# Patient Record
Sex: MALE | Race: BLACK OR AFRICAN AMERICAN
[De-identification: names, ages, dates, MRNs, and addresses within clinical notes are randomized per-mention and may not be internally consistent; named-entity substitution may affect disease eponyms.]

---

## 2018-03-28 PROBLEM — Z00.00 ENCOUNTER FOR PREVENTIVE HEALTH EXAMINATION: Status: ACTIVE | Noted: 2018-03-28

## 2018-04-03 ENCOUNTER — APPOINTMENT (OUTPATIENT)
Dept: OPHTHALMOLOGY | Facility: CLINIC | Age: 68
End: 2018-04-03
Payer: COMMERCIAL

## 2018-04-03 PROCEDURE — 92004 COMPRE OPH EXAM NEW PT 1/>: CPT

## 2018-05-17 ENCOUNTER — APPOINTMENT (OUTPATIENT)
Dept: OPHTHALMOLOGY | Facility: CLINIC | Age: 68
End: 2018-05-17
Payer: COMMERCIAL

## 2018-05-17 PROCEDURE — 92012 INTRM OPH EXAM EST PATIENT: CPT

## 2018-05-17 RX ORDER — PREDNISOLONE ACETATE 10 MG/ML
1 SUSPENSION/ DROPS OPHTHALMIC 3 TIMES DAILY
Qty: 1 | Refills: 5 | Status: ACTIVE | COMMUNITY
Start: 2018-05-17 | End: 1900-01-01

## 2018-06-28 ENCOUNTER — APPOINTMENT (OUTPATIENT)
Dept: OPHTHALMOLOGY | Facility: CLINIC | Age: 68
End: 2018-06-28
Payer: COMMERCIAL

## 2018-06-28 PROCEDURE — 92012 INTRM OPH EXAM EST PATIENT: CPT

## 2018-08-02 ENCOUNTER — APPOINTMENT (OUTPATIENT)
Dept: OPHTHALMOLOGY | Facility: CLINIC | Age: 68
End: 2018-08-02
Payer: COMMERCIAL

## 2018-08-02 PROCEDURE — 92012 INTRM OPH EXAM EST PATIENT: CPT

## 2019-03-22 ENCOUNTER — APPOINTMENT (OUTPATIENT)
Dept: OPHTHALMOLOGY | Facility: CLINIC | Age: 69
End: 2019-03-22
Payer: COMMERCIAL

## 2019-03-22 DIAGNOSIS — H16.301: ICD-10-CM

## 2019-03-22 PROCEDURE — 92014 COMPRE OPH EXAM EST PT 1/>: CPT

## 2019-10-28 ENCOUNTER — APPOINTMENT (OUTPATIENT)
Dept: OPHTHALMOLOGY | Facility: CLINIC | Age: 69
End: 2019-10-28
Payer: COMMERCIAL

## 2019-10-28 ENCOUNTER — NON-APPOINTMENT (OUTPATIENT)
Age: 69
End: 2019-10-28

## 2019-10-28 PROCEDURE — 92012 INTRM OPH EXAM EST PATIENT: CPT

## 2020-02-20 ENCOUNTER — HOSPITAL ENCOUNTER (INPATIENT)
Dept: HOSPITAL 74 - JER | Age: 70
LOS: 2 days | Discharge: HOME | DRG: 660 | End: 2020-02-22
Attending: FAMILY MEDICINE | Admitting: FAMILY MEDICINE
Payer: COMMERCIAL

## 2020-02-20 VITALS — BODY MASS INDEX: 28.9 KG/M2

## 2020-02-20 DIAGNOSIS — N40.0: ICD-10-CM

## 2020-02-20 DIAGNOSIS — I69.351: ICD-10-CM

## 2020-02-20 DIAGNOSIS — I10: ICD-10-CM

## 2020-02-20 DIAGNOSIS — E78.5: ICD-10-CM

## 2020-02-20 DIAGNOSIS — N13.2: Primary | ICD-10-CM

## 2020-02-20 LAB
ALBUMIN SERPL-MCNC: 3.9 G/DL (ref 3.4–5)
ALP SERPL-CCNC: 55 U/L (ref 45–117)
ALT SERPL-CCNC: 30 U/L (ref 13–61)
ANION GAP SERPL CALC-SCNC: 6 MMOL/L (ref 8–16)
AST SERPL-CCNC: 28 U/L (ref 15–37)
BASOPHILS # BLD: 0.3 % (ref 0–2)
BILIRUB SERPL-MCNC: 0.5 MG/DL (ref 0.2–1)
BUN SERPL-MCNC: 15.8 MG/DL (ref 7–18)
CALCIUM SERPL-MCNC: 9.1 MG/DL (ref 8.5–10.1)
CHLORIDE SERPL-SCNC: 104 MMOL/L (ref 98–107)
CO2 SERPL-SCNC: 28 MMOL/L (ref 21–32)
CREAT SERPL-MCNC: 1.3 MG/DL (ref 0.55–1.3)
DEPRECATED RDW RBC AUTO: 13.8 % (ref 11.9–15.9)
EOSINOPHIL # BLD: 0.1 % (ref 0–4.5)
GLUCOSE SERPL-MCNC: 124 MG/DL (ref 74–106)
HCT VFR BLD CALC: 46.9 % (ref 35.4–49)
HGB BLD-MCNC: 16 GM/DL (ref 11.7–16.9)
LYMPHOCYTES # BLD: 8 % (ref 8–40)
MCH RBC QN AUTO: 30.6 PG (ref 25.7–33.7)
MCHC RBC AUTO-ENTMCNC: 34.2 G/DL (ref 32–35.9)
MCV RBC: 89.4 FL (ref 80–96)
MONOCYTES # BLD AUTO: 4.4 % (ref 3.8–10.2)
NEUTROPHILS # BLD: 87.2 % (ref 42.8–82.8)
PLATELET # BLD AUTO: 160 K/MM3 (ref 134–434)
PMV BLD: 9.8 FL (ref 7.5–11.1)
POTASSIUM SERPLBLD-SCNC: 3.9 MMOL/L (ref 3.5–5.1)
PROT SERPL-MCNC: 7.6 G/DL (ref 6.4–8.2)
RBC # BLD AUTO: 5.24 M/MM3 (ref 4–5.6)
SODIUM SERPL-SCNC: 139 MMOL/L (ref 136–145)
WBC # BLD AUTO: 12.2 K/MM3 (ref 4–10)

## 2020-02-20 NOTE — PDOC
History of Present Illness





- General


Chief Complaint: Pain, Acute


Stated Complaint: BACK PAIN


History Source: Patient


Exam Limitations: No Limitations





- History of Present Illness


Initial Comments: 





02/20/20 23:56


69 yo M with a hx of HTN, HLD, CVA (right sided deficits (Weakness)) and 

nephrolithiasis presents to the emergency department with right flank pain. Per 

the patient, he states it occurred suddenly this morning. The pain is 

consistent with his previous episode of nephrolithiasis and is sharp in nature, 

8/10, and worsens with movement and denies relieving factors. Endorses nausea 

and vomiting. Denies the following: fevers, chills, chest pain, SOB, abdominal 

pain, dysuria, hematuria, diarrhea, urinary urgency, urinary frequency, diarrhea

, hematochezia, testicular swelling/pain, penile discharge, and constipation. 





Allergies: NKDA











Past History





- Past Medical History


Allergies/Adverse Reactions: 


 Allergies











Allergy/AdvReac Type Severity Reaction Status Date / Time


 


No Known Drug Allergies Allergy   Verified 02/20/20 20:33











Home Medications: 


Ambulatory Orders





Ascorbic Acid [Vitamin C -] 1,000 mg PO DAILY 05/05/14 


Aspirin Coated [Ecotrin -] 81 mg PO DAILY 05/05/14 


Hydrochlorothiazide [Hctz -] 25 mg PO DAILY 05/05/14 


Metoprolol Succinate [Toprol XL -] 25 mg PO DAILY 05/05/14 


Valsartan [Diovan] 160 mg PO DAILY 05/05/14 


Vitamin E [Formula E] 400 unit PO DAILY 05/05/14 


Oxycodone HCl/Acetaminophen [Percocet 5-325 mg Tablet -] 1 - 2 combo PO Q6H PRN 

#40 tab 05/14/14 








CVA: Yes ('06 RIGHT SIDED WEAKNESS)


COPD: No


HTN: Yes


Hypercholesterolemia: Yes





- Surgical History


Abdominal Surgery: Yes (HERNIA)





- Psycho Social/Smoking Cessation Hx


Smoking History: Never smoked


Have you smoked in the past 12 months: No


Hx Alcohol Use: Yes (RARE)


Substance Use Type: Alcohol


Hx Substance Use Treatment: No





**Review of Systems





- Review of Systems


Able to Perform ROS?: Yes


Is the patient limited English proficient: No


Constitutional: No: Chills, Diaphoresis, Fever, Weakness


HEENTM: No: Eye Pain, Ear Pain, Nose Pain, Throat Pain, Mouth Pain


Respiratory: No: Cough, Shortness of Breath, Hemoptysis


Cardiac (ROS): No: Chest Pain, Lightheadedness, Palpitations, Chest Tightness


ABD/GI: Yes: Nausea, Vomiting.  No: Constipated, Diarrhea, Rectal Bleeding, 

Tarry Stools


: Yes: Flank Pain (right).  No: Burning, Dysuria, Hematuria, Testicular Mass, 

Testicular Swelling, Testicular Pain


Musculoskeletal: No: Back Pain, Joint Pain, Neck Pain


Integumentary: No: Bruising, Erythema, Rash


Neurological: No: Headache, Numbness, Tingling, Tremors


Psychiatric: No: Change in Appetite


Endocrine: No: Unexplained Weight Loss


Hematologic/Lymphatic: No: Anemia





*Physical Exam





- Vital Signs


 Last Vital Signs











Temp Pulse Resp BP Pulse Ox


 


 97.5 F L  61   18   213/92 H  97 


 


 02/20/20 20:28  02/20/20 20:28  02/20/20 20:28  02/20/20 20:28  02/20/20 20:28














- Physical Exam


General Appearance: Yes: Nourished, Appropriately Dressed.  No: Apparent 

Distress, Intoxicated, Obese


HEENT: positive: EOMI, AMINTA, Normal Voice, Symmetrical, Pharynx Normal, Hearing 

Grossly Normal.  negative: Pale Conjunctivae, Scleral Icterus (R), Scleral 

Icterus (L), Muffled/Hoarse voice, Pharyngeal Erythema, Tonsillar Exudate, 

Tonsillar Erythema, Nasal Congestion, Rhinorrhea, Sinus Tenderness, Excessive 

drooling


Neck: positive: Trachea midline, Supple.  negative: Tender, Lymphadenopathy (R)

, Lymphadenopathy (L), Tender lateral, Tender midline


Respiratory/Chest: positive: Lungs Clear, Normal Breath Sounds.  negative: 

Chest Tender, Respiratory Distress, Accessory Muscle Use, Crackles, Rales, 

Rhonchi, Stridor


Cardiovascular: positive: Regular Rhythm, Regular Rate, S1, S2.  negative: 

Systolic Murmur


Gastrointestinal/Abdominal: positive: Normal Bowel Sounds, Flat, Soft.  negative

: Tender


Lymphatic: negative: Adenopathy


Musculoskeletal: positive: Normal Inspection, CVA Tenderness (R).  negative: 

CVA Tenderness (L), Vertebral Tenderness


Extremity: positive: Normal Capillary Refill, Normal Inspection, Normal Range 

of Motion.  negative: Tender, Calf Tenderness, Erythema


Integumentary: positive: Normal Color, Dry, Warm.  negative: Swelling, 

Ecchymosis


Neurologic: positive: Fully Oriented, Alert, Normal Mood/Affect





ED Treatment Course





- LABORATORY


CBC & Chemistry Diagram: 


 02/20/20 21:40





 02/20/20 21:40





Medical Decision Making





- Medical Decision Making





69 yo M with a hx of HTN, HLD, CVA (right sided deficits (Weakness)) and 

nephrolithiasis presents to the emergency department with right flank pain.





Initial vitals:


 Initial Vital Signs











Temp Pulse Resp BP Pulse Ox


 


 97.5 F L  61   18   213/92 H  97 


 


 02/20/20 20:28  02/20/20 20:28  02/20/20 20:28  02/20/20 20:28  02/20/20 20:28








Work up:


patient presents with right flank pain that is consistent with nephrolithiasis. 


ddx: UTI vs Nephrolithiasis vs pyelonephritis vs appendicitis vs colitis vs msk 

strain vs AAA 


US AAA study shows no dilatation of the aorta at the prox, mid, and distal 

portion. Right kidney US shows multiple simple cysts with mild hydronephrosis. 





 Laboratory Tests











  02/20/20 02/20/20





  21:40 21:40


 


WBC  12.2 H 


 


RBC  5.24 


 


Hgb  16.0 


 


Hct  46.9 


 


MCV  89.4 


 


MCH  30.6 


 


MCHC  34.2 


 


RDW  13.8 


 


Plt Count  160 


 


MPV  9.8 


 


Absolute Neuts (auto)  10.7 H 


 


Neutrophils %  87.2 H 


 


Lymphocytes %  8.0 


 


Monocytes %  4.4 


 


Eosinophils %  0.1 


 


Basophils %  0.3 


 


Nucleated RBC %  0 


 


Sodium   139


 


Potassium   3.9


 


Chloride   104


 


Carbon Dioxide   28


 


Anion Gap   6 L


 


BUN   15.8


 


Creatinine   1.3


 


Est GFR (CKD-EPI)AfAm   63.18


 


Est GFR (CKD-EPI)NonAf   54.51


 


Random Glucose   124 H


 


Calcium   9.1


 


Total Bilirubin   0.5


 


AST   28


 


ALT   30


 


Alkaline Phosphatase   55


 


Total Protein   7.6


 


Albumin   3.9








CT abdomen and pelvis: on my read, the patient has extensive eva-nephric 

stranding around the right kidney with multiple large cysts with mild 

hydronephrosis and a 7 mm stone at the right UVJ. Awaiting official report


Patient was given toradol, tylenol, and morphine with marked improvement in 

symptoms. 


Patient to be signed out to Dr. Crandall with pending UA studies. Will likely 

need urology consult for infected stone and inpatient admission for urological 

intervention and abx. 





02/20/20 23:34


148/78 BP on repeat








Discharge





- Discharge Information


Problems reviewed: Yes


Clinical Impression/Diagnosis: 


 Nephrolithiasis, Hydronephrosis








- Follow up/Referral





- Patient Discharge Instructions





- Post Discharge Activity

## 2020-02-21 LAB
ALBUMIN SERPL-MCNC: 3.4 G/DL (ref 3.4–5)
ALP SERPL-CCNC: 50 U/L (ref 45–117)
ALT SERPL-CCNC: 26 U/L (ref 13–61)
ANION GAP SERPL CALC-SCNC: 3 MMOL/L (ref 8–16)
APPEARANCE UR: CLEAR
AST SERPL-CCNC: 21 U/L (ref 15–37)
BACTERIA # UR AUTO: 1 /HPF
BASOPHILS # BLD: 0.5 % (ref 0–2)
BILIRUB SERPL-MCNC: 0.7 MG/DL (ref 0.2–1)
BILIRUB UR STRIP.AUTO-MCNC: NEGATIVE MG/DL
BUN SERPL-MCNC: 19.9 MG/DL (ref 7–18)
CALCIUM SERPL-MCNC: 8.7 MG/DL (ref 8.5–10.1)
CASTS URNS QL MICRO: 2.23 /LPF (ref 0–8)
CHLORIDE SERPL-SCNC: 104 MMOL/L (ref 98–107)
CO2 SERPL-SCNC: 31 MMOL/L (ref 21–32)
COLOR UR: YELLOW
CREAT SERPL-MCNC: 1.3 MG/DL (ref 0.55–1.3)
DEPRECATED RDW RBC AUTO: 13.9 % (ref 11.9–15.9)
EOSINOPHIL # BLD: 0.9 % (ref 0–4.5)
EPITH CASTS URNS QL MICRO: 2.6 /HPF
GLUCOSE SERPL-MCNC: 93 MG/DL (ref 74–106)
HCT VFR BLD CALC: 43.1 % (ref 35.4–49)
HGB BLD-MCNC: 14.8 GM/DL (ref 11.7–16.9)
INR BLD: 1.06 (ref 0.83–1.09)
KETONES UR QL STRIP: NEGATIVE
LEUKOCYTE ESTERASE UR QL STRIP.AUTO: NEGATIVE
LYMPHOCYTES # BLD: 16.7 % (ref 8–40)
MCH RBC QN AUTO: 30.5 PG (ref 25.7–33.7)
MCHC RBC AUTO-ENTMCNC: 34.3 G/DL (ref 32–35.9)
MCV RBC: 89 FL (ref 80–96)
MONOCYTES # BLD AUTO: 10.9 % (ref 3.8–10.2)
NEUTROPHILS # BLD: 71 % (ref 42.8–82.8)
NITRITE UR QL STRIP: NEGATIVE
PH UR: 6.5 [PH] (ref 5–8)
PLATELET # BLD AUTO: 134 K/MM3 (ref 134–434)
PMV BLD: 9.5 FL (ref 7.5–11.1)
POTASSIUM SERPLBLD-SCNC: 3.7 MMOL/L (ref 3.5–5.1)
PROT SERPL-MCNC: 6.7 G/DL (ref 6.4–8.2)
PROT UR QL STRIP: 30
PROT UR QL STRIP: NEGATIVE
PT PNL PPP: 12.5 SEC (ref 9.7–13)
RBC # BLD AUTO: 242.1 /HPF (ref 0–4)
RBC # BLD AUTO: 4.84 M/MM3 (ref 4–5.6)
SODIUM SERPL-SCNC: 138 MMOL/L (ref 136–145)
SP GR UR: 1.06 (ref 1.01–1.03)
UROBILINOGEN UR STRIP-MCNC: 1 MG/DL (ref 0.2–1)
WBC # BLD AUTO: 9.6 K/MM3 (ref 4–10)
WBC # UR AUTO: 9.4 /HPF (ref 0–5)

## 2020-02-21 PROCEDURE — BT1DZZZ FLUOROSCOPY OF RIGHT KIDNEY, URETER AND BLADDER: ICD-10-PCS | Performed by: UROLOGY

## 2020-02-21 PROCEDURE — 0T768DZ DILATION OF RIGHT URETER WITH INTRALUMINAL DEVICE, VIA NATURAL OR ARTIFICIAL OPENING ENDOSCOPIC: ICD-10-PCS | Performed by: UROLOGY

## 2020-02-21 RX ADMIN — CEFTRIAXONE SCH MLS/HR: 1 INJECTION, POWDER, FOR SOLUTION INTRAMUSCULAR; INTRAVENOUS at 14:09

## 2020-02-21 RX ADMIN — SODIUM CHLORIDE SCH MLS/HR: 9 INJECTION, SOLUTION INTRAVENOUS at 11:11

## 2020-02-21 RX ADMIN — HYDROCHLOROTHIAZIDE SCH MG: 25 TABLET ORAL at 11:12

## 2020-02-21 RX ADMIN — LOSARTAN POTASSIUM SCH MG: 50 TABLET, FILM COATED ORAL at 11:11

## 2020-02-21 RX ADMIN — ASPIRIN SCH MG: 81 TABLET, COATED ORAL at 11:11

## 2020-02-21 NOTE — OP
Operative Note





- Note:


Operative Date: 02/21/20


Pre-Operative Diagnosis: RT. HYDRONEPHROSIS, RT. MID-URETERAL STONE


Operation: CYSTO, RT. RETROGRADE PYELOGRAM, RT. URETEROSCOPY AND STONE 

MANIPULATION AND PLACEMENT OF A RT. 24CM-6F JJ STENT. PT. WILL NEED OP ESWL 

NEXT WK.


Findings: 





IMPACTED RT. MID-URETERAL STONE WITH RT. HYDROURETERONEPHROSIS.


Post-Operative Diagnosis: Same as Pre-op


Surgeon: Myranda Esquivel


Anesthesia: General


Specimens Removed: URINE


Estimated Blood Loss (mls): 0


Drains & Tubes with Location: 24CM-6F-JJ STENT


Drains, Volume Out (mls): 0


Blood Volume Replaced (mls): 0


Fluid Volume Replaced (mls): 0


Operative Report Dictated: Yes

## 2020-02-21 NOTE — CONS
DATE OF CONSULTATION:  

 

DATE OF DICTATION:  02/21/2020

 

HISTORY OF PRESENT ILLNESS:  Patient is a 70-year-old male, admitted via the

emergency room, with acute onset of right flank pain colicky in nature.  This began

last night.  The pain radiates down to the right groin, right lower quadrant, and

testicle, sharp and colicky in nature.  Patient has had a CVA in the past with no

residual.  He also has history of BPH.  He never smoked.  He rarely uses alcohol.  He

works in the [place] [employer] [place].  He states that his father had prostate

cancer.  There is no recent travel.  There are no known drug allergies.  In the

emergency room, his temperature was 97.6, blood pressure 152/73, pulse oximetry 96,

respirations 18 and regular.

 

His white count was 9.6, hemoglobin and hematocrit is 14.8 over 43.1, platelets were

134, BUN and creatinine were 19.9 over 1.3, random glucose was 93.

 

The patient underwent a CT of the abdomen and pelvis without contrast and this

revealed distal right ureteral stone causing proximal hydroureteronephrosis.  The

patient's liver was negative.  Gallbladder negative.  Biliary ducts nondilated. 

Pancreas negative.  Spleen negative.  Adrenals negative.  The kidneys revealed right

perinephric stranding with hydroureteronephrosis.  The stone that was previously in

the renal pelvis is now in the lower ureter and measures 8.3 mm.  There are

right-sided, multiple renal cysts, the most dominant one is in the upper pole

measuring 6.1 cm.  The left kidney reveals multiple cysts in the lower pole and upper

pole.

 

IMPRESSION:  At present is right hydronephrosis, right ureteral stone.

 

PLAN:  Is for a cystoscopy, right retrograde pyelogram, right ureteroscopic laser

lithotripsy with placement of a JJ stent.  Patient will undergo a stone workup,

including a 24-hour urine collection, as well as a PTH level, uric acid, oxylate, and

phosphorus level.  This was explained to patient and family and they agreed.

 

 

CHRISTIANO [id number]

DD: 02/21/2020 16:01

DT: 02/21/2020 16:16

Job #:  [id number]

## 2020-02-21 NOTE — OP
DATE OF OPERATION:  

 

DATE OF DICTATION:  02/21/2020

 

PREOPERATIVE DIAGNOSIS:  Right hydronephrosis.  Right ureteral stone.  

 

POSTOPERATIVE DIAGNOSIS:  Right hydronephrosis.  Right ureteral stone.  

 

OPERATIVE PROCEDURE:  Cystourethroscopy, right retrograde pyelogram, right

ureteroscopy, and placement of a right JJ stent.

 

ANESTHESIA:  General.  

 

DESCRIPTION OF PROCEDURE:  Under above stated anesthesia, patient was prepped and

draped in the usual sterile manner.  He was placed in the dorsal lithotomy position. 

External genitalia were atraumatic.  Meatus is adequate.  Cystoscopy using a

continuous flow 30-degree scope revealed normal anterior urethra.  Prostatic urethra

revealed mild bilateral hypertrophy of the prostate.  The bladder was entered.  Urine

was collected for CNS.  Inspection of the bladder revealed a grade 1 trabeculation. 

No lesions were noted.  No calculi were seen.  Ureteral orifices were within normal

limits with efflux of clear urine from the left, none was seen from the right.  A

Flexi-Tip catheter was placed into the right ureteral orifice and 5 mL of contrast

was injected.  This revealed dilated lower ureter with no advancement of the contrast

above the lower third of the ureter.  The cystoscope was removed.  Ureteroscopy was

also performed, and this revealed a stone in the upper portion of the lower 3rd

ureter with some maneuvering and multiple attempts.  A glidewire was passed by the

stone and into the right renal pelvis.  A 24-cm, 6-Kazakh JJ stent was placed into

the right kidney.  X-rays confirmed good position of the stents.  The bladder was

emptied, the scope was removed.  The patient tolerated the procedure well.  He

returned to the recovery room in good condition.   

 

 

CHRISTIANO [id number]

DD: 02/21/2020 16:51

DT: 02/21/2020 18:57

Job #:  [id number]

## 2020-02-21 NOTE — HP
Admitting History and Physical





- Admission


Chief Complaint: rt flank pain scale 6 last pm.  rad down rt abd


History Source: Patient





- Past Medical History


CNS: Yes: CVA


Renal/: Yes: BPH, Renal Calculi


Musculoskeletal: Yes: Hemiplegia





- Smoking History


Smoking history: Never smoked


Have you smoked in the past 12 months: No





- Alcohol/Substance Use


Hx Alcohol Use: Yes (RARE)





- Social History


Usual Living Arrangement: Yes: With Spouse


ADL: Independent


History of Recent Travel: No





Home Medications





- Allergies


Allergies/Adverse Reactions: 


 Allergies











Allergy/AdvReac Type Severity Reaction Status Date / Time


 


No Known Drug Allergies Allergy   Verified 02/20/20 20:33














- Home Medications


Home Medications: 


Ambulatory Orders





Ascorbic Acid [Vitamin C -] 1,000 mg PO DAILY 05/05/14 


Aspirin Coated [Ecotrin -] 81 mg PO DAILY 05/05/14 


Hydrochlorothiazide [Hctz -] 25 mg PO DAILY 05/05/14 


Metoprolol Succinate [Toprol XL -] 25 mg PO DAILY 05/05/14 


Valsartan [Diovan] 160 mg PO DAILY 05/05/14 


Vitamin E [Formula E] 400 unit PO DAILY 05/05/14 


Oxycodone HCl/Acetaminophen [Percocet 5-325 mg Tablet -] 1 - 2 combo PO Q6H PRN 

#40 tab 05/14/14 











Family Medical History


Family History: Unremarkable





Review of Systems





- Review of Systems


Constitutional: reports: No Symptoms


Eyes: reports: No Symptoms, Floaters


HENT: reports: No Symptoms


Neck: reports: No Symptoms


Cardiovascular: reports: No Symptoms


Respiratory: reports: No Symptoms


Gastrointestinal: reports: No Symptoms


Genitourinary: reports: Flank Pain


Breasts: reports: No Symptoms Reported


Musculoskeletal: reports: No Symptoms


Integumentary: reports: No Symptoms


Neurological: reports: Other (rt terrence)


Endocrine: reports: No Symptoms


Hematology/Lymphatic: reports: No Symptoms


Psychiatric: reports: No Symptoms





Physical Examination


Vital Signs: 


 Vital Signs











Temperature  97.6 F   02/21/20 13:07


 


Pulse Rate  61   02/21/20 13:07


 


Respiratory Rate  18   02/21/20 13:07


 


Blood Pressure  152/73   02/21/20 13:07


 


O2 Sat by Pulse Oximetry (%)  96   02/21/20 12:45











Constitutional: Yes: Calm


Eyes: Yes: WNL


HENT: Yes: WNL


Neck: Yes: WNL


Cardiovascular: Yes: WNL


Respiratory: Yes: WNL


Gastrointestinal: Yes: WNL


...Rectal Exam: Yes: Deferred


Renal/: Yes: CVA Tenderness - Left


Breast(s): Yes: WNL


Musculoskeletal: Yes: WNL


Extremities: Yes: WNL


Edema: No


Peripheral Pulses WNL: Yes


Integumentary: Yes: WNL


Neurological: Yes: WNL


...Motor Strength: WNL


Psychiatric: Yes: WNL


Labs: 


 CBC, BMP





 02/21/20 10:52 





 02/21/20 10:52 











Assessment/Plan





cleared procedure


vss gu today ???? eswal vs lithotripsy ? stent

## 2020-02-21 NOTE — CON.ID
Consult


Consult Specialty:: infectious diseases


Referred by:: dr jaimes


Reason for Consultation:: chills rt sided pain





- History of Present Illness


Chief Complaint: rt sided abd pain


History of Present Illness: 





70 year old male with a significant medical history of HTN on metoprolol and 

prior stroke (2006) with residual right upper extremity paresis. Patient 

presents to the ED today with right sided flank pain that radiates to the right 

side of the abdomen,according to the patient the pain radiated down


currently he still feels that he is in discomfort and the feels a sensation


patient has a pretty large sized stone,


urology has  seen the patient and the plan is to take the patient to the or for 

surgery for the renal stone





- History Source


History Provided By: Patient


Limitations to Obtaining History: No Limitations





- Alcohol/Substance Use


Hx Alcohol Use: Yes (RARE)





- Smoking History


Smoking history: Never smoked


Have you smoked in the past 12 months: No





Home Medications





- Allergies


Allergies/Adverse Reactions: 


 Allergies











Allergy/AdvReac Type Severity Reaction Status Date / Time


 


No Known Drug Allergies Allergy   Verified 02/20/20 20:33














- Home Medications


Home Medications: 


Ambulatory Orders





Ascorbic Acid [Vitamin C -] 1,000 mg PO DAILY 05/05/14 


Aspirin Coated [Ecotrin -] 81 mg PO DAILY 05/05/14 


Hydrochlorothiazide [Hctz -] 25 mg PO DAILY 05/05/14 


Metoprolol Succinate [Toprol XL -] 25 mg PO DAILY 05/05/14 


Valsartan [Diovan] 160 mg PO DAILY 05/05/14 


Vitamin E [Formula E] 400 unit PO DAILY 05/05/14 


Oxycodone HCl/Acetaminophen [Percocet 5-325 mg Tablet -] 1 - 2 combo PO Q6H PRN 

#40 tab 05/14/14 











Review of Systems





- Review of Systems


Constitutional: reports: No Symptoms


Eyes: reports: No Symptoms


HENT: reports: No Symptoms


Neck: reports: No Symptoms


Cardiovascular: reports: No Symptoms


Respiratory: reports: No Symptoms


Gastrointestinal: reports: No Symptoms


Genitourinary: reports: Flank Pain


Musculoskeletal: reports: No Symptoms


Integumentary: reports: No Symptoms


Neurological: reports: No Symptoms


Endocrine: reports: No Symptoms


Hematology/Lymphatic: reports: No Symptoms


Psychiatric: reports: No Symptoms





Physical Exam


Vital Signs: 


 Vital Signs











Temperature  97.6 F   02/21/20 13:07


 


Pulse Rate  61   02/21/20 13:07


 


Respiratory Rate  18   02/21/20 13:07


 


Blood Pressure  152/73   02/21/20 13:07


 


O2 Sat by Pulse Oximetry (%)  96   02/21/20 12:45











Constitutional: Yes: Well Nourished, No Distress, Calm


Cardiovascular: Yes: Regular Rate and Rhythm


Respiratory: Yes: Regular, CTA Bilaterally


Gastrointestinal: Yes: Normal Bowel Sounds, Soft


Renal/: Yes: CVA Tenderness - Right


Musculoskeletal: Yes: WNL


Extremities: Yes: WNL


Neurological: Yes: Alert, Oriented


Psychiatric: Yes: Alert, Oriented


Labs: 


 CBC, BMP





 02/21/20 10:52 





 02/21/20 10:52 











Imaging





- Results


Cat Scan: Report Reviewed, Image Reviewed





Assessment/Plan





patient with multiple medical problems with flank pain


for surgery today





plan


will continue ceftriaxone


await for surgery


hydration


rest as per the team

## 2020-02-21 NOTE — EKG
Test Reason : 

Blood Pressure : ***/*** mmHG

Vent. Rate : 061 BPM     Atrial Rate : 061 BPM

   P-R Int : 180 ms          QRS Dur : 078 ms

    QT Int : 408 ms       P-R-T Axes : 064 005 036 degrees

   QTc Int : 410 ms

 

NORMAL SINUS RHYTHM WITH SINUS ARRHYTHMIA

WHEN COMPARED WITH ECG OF 16-JUL-2006 15:10,

NO SIGNIFICANT CHANGE WAS FOUND

Confirmed by AASHISH VELEZ MD ([id number]) on 2/21/2020 11:01:01 AM

 

Referred By:             Confirmed By:AASHISH VELEZ MD
Test Reason : 

Blood Pressure : ***/*** mmHG

Vent. Rate : 070 BPM     Atrial Rate : 070 BPM

   P-R Int : 184 ms          QRS Dur : 084 ms

    QT Int : 394 ms       P-R-T Axes : 061 -07 012 degrees

   QTc Int : 425 ms

 

NORMAL SINUS RHYTHM

MINIMAL VOLTAGE CRITERIA FOR LVH, MAY BE NORMAL VARIANT

WHEN COMPARED WITH ECG OF 21-FEB-2020 00:27,

NO SIGNIFICANT CHANGE WAS FOUND

Confirmed by AASHISH VELEZ MD ([id number]) on 2/21/2020 10:50:12 AM

 

Referred By: AN LYNCH           Confirmed By:AASHISH VELEZ MD
Controlled with current regime

## 2020-02-22 VITALS — DIASTOLIC BLOOD PRESSURE: 68 MMHG | TEMPERATURE: 98.4 F | SYSTOLIC BLOOD PRESSURE: 140 MMHG

## 2020-02-22 VITALS — HEART RATE: 80 BPM

## 2020-02-22 LAB
ALBUMIN SERPL-MCNC: 3 G/DL (ref 3.4–5)
ALP SERPL-CCNC: 46 U/L (ref 45–117)
ALT SERPL-CCNC: 26 U/L (ref 13–61)
ANION GAP SERPL CALC-SCNC: 6 MMOL/L (ref 8–16)
AST SERPL-CCNC: 33 U/L (ref 15–37)
BASOPHILS # BLD: 0.3 % (ref 0–2)
BILIRUB SERPL-MCNC: 0.6 MG/DL (ref 0.2–1)
BUN SERPL-MCNC: 17.9 MG/DL (ref 7–18)
CALCIUM SERPL-MCNC: 9 MG/DL (ref 8.5–10.1)
CHLORIDE SERPL-SCNC: 104 MMOL/L (ref 98–107)
CO2 SERPL-SCNC: 29 MMOL/L (ref 21–32)
CREAT SERPL-MCNC: 1.2 MG/DL (ref 0.55–1.3)
DEPRECATED RDW RBC AUTO: 13.4 % (ref 11.9–15.9)
EOSINOPHIL # BLD: 1.6 % (ref 0–4.5)
GLUCOSE SERPL-MCNC: 102 MG/DL (ref 74–106)
HCT VFR BLD CALC: 38.6 % (ref 35.4–49)
HGB BLD-MCNC: 13.3 GM/DL (ref 11.7–16.9)
LYMPHOCYTES # BLD: 23.8 % (ref 8–40)
MCH RBC QN AUTO: 30.6 PG (ref 25.7–33.7)
MCHC RBC AUTO-ENTMCNC: 34.5 G/DL (ref 32–35.9)
MCV RBC: 88.7 FL (ref 80–96)
MONOCYTES # BLD AUTO: 9.2 % (ref 3.8–10.2)
NEUTROPHILS # BLD: 65.1 % (ref 42.8–82.8)
PLATELET # BLD AUTO: 133 K/MM3 (ref 134–434)
PMV BLD: 9.8 FL (ref 7.5–11.1)
POTASSIUM SERPLBLD-SCNC: 3.4 MMOL/L (ref 3.5–5.1)
PROT SERPL-MCNC: 6 G/DL (ref 6.4–8.2)
RBC # BLD AUTO: 4.35 M/MM3 (ref 4–5.6)
SODIUM SERPL-SCNC: 139 MMOL/L (ref 136–145)
WBC # BLD AUTO: 6.5 K/MM3 (ref 4–10)

## 2020-02-22 RX ADMIN — LOSARTAN POTASSIUM SCH MG: 50 TABLET, FILM COATED ORAL at 09:48

## 2020-02-22 RX ADMIN — HYDROCHLOROTHIAZIDE SCH MG: 25 TABLET ORAL at 09:48

## 2020-02-22 RX ADMIN — SODIUM CHLORIDE SCH MLS/HR: 9 INJECTION, SOLUTION INTRAVENOUS at 09:47

## 2020-02-22 RX ADMIN — CEFTRIAXONE SCH MLS/HR: 1 INJECTION, POWDER, FOR SOLUTION INTRAMUSCULAR; INTRAVENOUS at 09:47

## 2020-02-22 RX ADMIN — ASPIRIN SCH MG: 81 TABLET, COATED ORAL at 09:47

## 2020-02-22 NOTE — DS
Physical Examination


Vital Signs: 


 Vital Signs











Temperature  98.4 F   02/22/20 09:00


 


Pulse Rate  80   02/22/20 09:00


 


Respiratory Rate  17   02/22/20 09:00


 


Blood Pressure  140/68   02/22/20 09:00


 


O2 Sat by Pulse Oximetry (%)  94 L  02/21/20 21:00











Constitutional: Yes: Well Nourished


Eyes: Yes: WNL


HENT: Yes: WNL


Neck: Yes: WNL


Cardiovascular: Yes: WNL


Respiratory: Yes: WNL


Gastrointestinal: Yes: WNL


...Rectal Exam: Yes: Deferred


Renal/: Yes: Hematuria


Breast(s): Yes: WNL


Musculoskeletal: Yes: WNL


Extremities: Yes: WNL


Edema: No


Peripheral Pulses WNL: Yes


Integumentary: Yes: WNL


Wound/Incision: Yes: Clean/Dry


Neurological: Yes: WNL


...Motor Strength: WNL


Psychiatric: Yes: WNL


Labs: 


 CBC, BMP





 02/22/20 06:20 





 02/22/20 06:20 











Discharge Summary


Problems reviewed: Yes


Reason For Visit: CALCULUS OF KIDNEY


Current Active Problems





Hydronephrosis (Acute)


Nephrolithiasis (Acute)











- Instructions


Diet, Activity, Other Instructions: 


gu f/u out pt to make appt


appy w me wed 1200 noon


cont all tx as is home


sono renal prior to leaving


d/c pt home as directed


Referrals: 


Nathen Esquivel RES [Primary Care Provider] - 





- Home Medications


Comprehensive Discharge Medication List: 


Ambulatory Orders





Ascorbic Acid [Vitamin C -] 1,000 mg PO DAILY 05/05/14 


Aspirin Coated [Ecotrin -] 81 mg PO DAILY 05/05/14 


Hydrochlorothiazide [Hctz -] 25 mg PO DAILY 05/05/14 


Metoprolol Succinate [Toprol XL -] 25 mg PO DAILY 05/05/14 


Valsartan [Diovan] 160 mg PO DAILY 05/05/14 


Vitamin E [Formula E] 400 unit PO DAILY 05/05/14 


Oxycodone HCl/Acetaminophen [Percocet 5-325 mg Tablet -] 1 - 2 combo PO Q6H PRN 

#40 tab 05/14/14

## 2020-02-25 NOTE — PATH
Cytology Non-Gynecological Report



Patient Name:  ODILON NUNO

Accession #:  [id number]

Med. Rec. #:  [id number]                                                        

   /Age/Gender:  1950 (Age: 70) / M

Account:  [id number]                                                          

             Location: [facility] MED/SURG

Taken:  2020

Received:  2020

Reported:  2020

Physicians:  Nathen Esquivel M.D.

  



Specimen(s) Received

 URINE 





Clinical History

Right hydronephrosis and ureteral stone







Final Diagnosis

URINE FOR CYTOLOGY:

SATISFACTORY FOR EVALUATION.

NEGATIVE FOR HIGH GRADE UROTHELIAL CARCINOMA.

SCATTERED UROTHELIAL CELLS PRESENT. RED BLOOD CELLS AND NEUTROPHILS PRESENT. 

FEW UROTHELIAL FRAGMENTS PRESENT.



Comment: Urothelial fragments are suggestive of lithiasis, however cytologic

differential diagnosis include prior instrumentation, or a low grade papillary

neoplasm. Suggest clinical/radiologic correlation.





***Electronically Signed***

Jadyn Croft M.D.





Gross Description

Approximately 50 cc of yellow fluid received fresh.  One cytofunnel prepared and

Pap stained.

## 2020-06-01 ENCOUNTER — APPOINTMENT (OUTPATIENT)
Dept: OPHTHALMOLOGY | Facility: CLINIC | Age: 70
End: 2020-06-01
Payer: COMMERCIAL

## 2020-06-01 ENCOUNTER — NON-APPOINTMENT (OUTPATIENT)
Age: 70
End: 2020-06-01

## 2020-06-01 PROCEDURE — 99441: CPT

## 2020-08-31 ENCOUNTER — APPOINTMENT (OUTPATIENT)
Dept: OPHTHALMOLOGY | Facility: CLINIC | Age: 70
End: 2020-08-31